# Patient Record
Sex: MALE | Race: WHITE | NOT HISPANIC OR LATINO | Employment: FULL TIME | URBAN - METROPOLITAN AREA
[De-identification: names, ages, dates, MRNs, and addresses within clinical notes are randomized per-mention and may not be internally consistent; named-entity substitution may affect disease eponyms.]

---

## 2024-04-13 PROCEDURE — 99283 EMERGENCY DEPT VISIT LOW MDM: CPT

## 2024-04-14 ENCOUNTER — HOSPITAL ENCOUNTER (EMERGENCY)
Facility: HOSPITAL | Age: 29
Discharge: HOME | End: 2024-04-14
Attending: STUDENT IN AN ORGANIZED HEALTH CARE EDUCATION/TRAINING PROGRAM

## 2024-04-14 VITALS
DIASTOLIC BLOOD PRESSURE: 78 MMHG | OXYGEN SATURATION: 98 % | HEIGHT: 68 IN | BODY MASS INDEX: 27.13 KG/M2 | TEMPERATURE: 99.3 F | RESPIRATION RATE: 15 BRPM | WEIGHT: 179 LBS | HEART RATE: 89 BPM | SYSTOLIC BLOOD PRESSURE: 129 MMHG

## 2024-04-14 DIAGNOSIS — K05.10 GINGIVITIS: ICD-10-CM

## 2024-04-14 DIAGNOSIS — K08.89 PAIN, DENTAL: Primary | ICD-10-CM

## 2024-04-14 PROCEDURE — 2500000001 HC RX 250 WO HCPCS SELF ADMINISTERED DRUGS (ALT 637 FOR MEDICARE OP): Performed by: STUDENT IN AN ORGANIZED HEALTH CARE EDUCATION/TRAINING PROGRAM

## 2024-04-14 PROCEDURE — 2500000005 HC RX 250 GENERAL PHARMACY W/O HCPCS: Performed by: STUDENT IN AN ORGANIZED HEALTH CARE EDUCATION/TRAINING PROGRAM

## 2024-04-14 RX ORDER — ONDANSETRON 4 MG/1
4 TABLET, ORALLY DISINTEGRATING ORAL ONCE
Status: COMPLETED | OUTPATIENT
Start: 2024-04-14 | End: 2024-04-14

## 2024-04-14 RX ORDER — CHLORHEXIDINE GLUCONATE ORAL RINSE 1.2 MG/ML
15 SOLUTION DENTAL AS NEEDED
Qty: 120 ML | Refills: 0 | Status: SHIPPED | OUTPATIENT
Start: 2024-04-14 | End: 2024-04-28

## 2024-04-14 RX ORDER — NAPROXEN 500 MG/1
500 TABLET ORAL 2 TIMES DAILY PRN
Qty: 20 TABLET | Refills: 0 | Status: SHIPPED | OUTPATIENT
Start: 2024-04-14

## 2024-04-14 RX ORDER — AMOXICILLIN 250 MG/1
500 CAPSULE ORAL ONCE
Status: COMPLETED | OUTPATIENT
Start: 2024-04-14 | End: 2024-04-14

## 2024-04-14 RX ORDER — AMOXICILLIN 500 MG/1
500 CAPSULE ORAL 3 TIMES DAILY
Qty: 21 CAPSULE | Refills: 0 | Status: SHIPPED | OUTPATIENT
Start: 2024-04-14 | End: 2024-04-21

## 2024-04-14 RX ORDER — OXYCODONE HYDROCHLORIDE 5 MG/1
5 TABLET ORAL ONCE
Status: COMPLETED | OUTPATIENT
Start: 2024-04-14 | End: 2024-04-14

## 2024-04-14 RX ADMIN — AMOXICILLIN 500 MG: 250 CAPSULE ORAL at 02:53

## 2024-04-14 RX ADMIN — OXYCODONE HYDROCHLORIDE 5 MG: 5 TABLET ORAL at 02:53

## 2024-04-14 RX ADMIN — ONDANSETRON 4 MG: 4 TABLET, ORALLY DISINTEGRATING ORAL at 02:53

## 2024-04-14 ASSESSMENT — PAIN DESCRIPTION - PROGRESSION: CLINICAL_PROGRESSION: NOT CHANGED

## 2024-04-14 ASSESSMENT — LIFESTYLE VARIABLES
TOTAL SCORE: 0
EVER FELT BAD OR GUILTY ABOUT YOUR DRINKING: NO
HAVE PEOPLE ANNOYED YOU BY CRITICIZING YOUR DRINKING: NO
HAVE YOU EVER FELT YOU SHOULD CUT DOWN ON YOUR DRINKING: NO
EVER HAD A DRINK FIRST THING IN THE MORNING TO STEADY YOUR NERVES TO GET RID OF A HANGOVER: NO

## 2024-04-14 ASSESSMENT — PAIN SCALES - GENERAL: PAINLEVEL_OUTOF10: 10 - WORST POSSIBLE PAIN

## 2024-04-14 ASSESSMENT — PAIN - FUNCTIONAL ASSESSMENT: PAIN_FUNCTIONAL_ASSESSMENT: 0-10

## 2024-04-14 ASSESSMENT — PAIN DESCRIPTION - ONSET: ONSET: AWAKENED FROM SLEEP

## 2024-04-14 ASSESSMENT — PAIN DESCRIPTION - FREQUENCY: FREQUENCY: CONSTANT/CONTINUOUS

## 2024-04-14 NOTE — ED PROVIDER NOTES
CC: Dental Pain (Pt has right lower dental pain for at least a week and its getting worse. Pt is afraid of the dentist)     HPI:  Patient is a 29-year-old male with no known past medical history who presents to the emergency department with dental pain.  States he is having pain in his right lower gums that is been going on for the last week but is getting worse.  States he had pain in his tooth in the similar area approximately a month ago but it went away.  Knows he needs to see a dentist.  Patient does not use smokeless tobacco but does smoke cigarettes.  Patient denies fevers or chills.  He denies facial swelling or difficulty swallowing.    Records Reviewed:  Recent available ED and inpatient notes reviewed in EMR.    PMHx/PSHx:  Per HPI.   - has no past medical history on file.  - has no past surgical history on file.  - does not have a problem list on file.    Medications:  Reviewed in EMR. See EMR for complete list of medications and doses.    Allergies:  Patient has no known allergies.    Social History:  - Tobacco:  has no history on file for tobacco use.   - Alcohol:  has no history on file for alcohol use.   - Illicit Drugs:  has no history on file for drug use.     ROS:  Per HPI.       ???????????????????????????????????????????????????????????????  Triage Vitals:  T 37.4 °C (99.3 °F)  HR 80  /90  RR 15  O2 100 % None (Room air)    Physical Exam  ???????????????????????????????????????????????????????????????  GEN: Uncomfortable appearing, no acute distress  HEAD: atraumatic  EYES: PERRL, EOMI  ENT: Significant gingivitis with no periapical abscess appreciated.  Poor dentition throughout.  No trismus.  Normal phonation.  Tolerating secretions.  CVS/CHEST: reg rate, nl rhythm  PULM: Nonlabored breathing.  Saturating appropriately on room air.  SKIN: warm, dry  PSYCH: AAOx3 answers questions appropriately    Assessment and Plan:  On exam patient is a 29-year-old male presenting the emergency  department dental pain.  He has significant gingivitis with no abscess appreciated.  He has having pain to palpation of his teeth with significant dental caries.  Concern for pulpitis in the setting of gingivitis and treated with amoxicillin as well as chlorhexidine rinse.  Patient offered an intraoral nerve block as well as an IM shot of Toradol however declined and states he is deathly afraid of needles.  Therefore given 1 oxycodone here and written prescription for naproxen.  Encouraged to follow-up with dental after written for course of amoxicillin, chlorhexidine mouthwash and naproxen as an outpatient.  Encouraged to return if he develops facial swelling, fever or difficulty swallowing or breathing.  Patient expressed understanding and is agreeable to plan.    ED Course:  Diagnoses as of 04/14/24 0621   Pain, dental   Gingivitis       Social Determinants Limiting Care:  Poor access to medical care    Disposition:  Discharged in stable condition with return precautions    Cinthia Hays DO      Procedures ? [employer] last updated 4/14/2024 6:21 AM        Cinthia Hays DO  04/14/24 0625

## 2024-04-14 NOTE — DISCHARGE INSTRUCTIONS
You need to go to the dentist and likely have teeth pulled.  If symptoms get worse please return to the emergency department for reevaluation if you have facial swelling, fever or worsening pain.